# Patient Record
Sex: FEMALE | Race: WHITE | NOT HISPANIC OR LATINO | Employment: FULL TIME | URBAN - METROPOLITAN AREA
[De-identification: names, ages, dates, MRNs, and addresses within clinical notes are randomized per-mention and may not be internally consistent; named-entity substitution may affect disease eponyms.]

---

## 2017-12-05 ENCOUNTER — PRENATAL OFFICE VISIT (OUTPATIENT)
Dept: OBGYN | Facility: CLINIC | Age: 27
End: 2017-12-05

## 2017-12-05 DIAGNOSIS — Z53.9 NO SHOW: Primary | ICD-10-CM

## 2017-12-05 NOTE — MR AVS SNAPSHOT
"              After Visit Summary   2017    Harriet Temple    MRN: [id number]           Patient Information     Date Of Birth          1990        Visit Information        Provider Department      2017 9:00 AM Jodee Wynne APRN CNM [facility]        Today's Diagnoses     NO SHOW    -  1       Follow-ups after your visit        Who to contact     If you have questions or need follow up information about today's clinic visit or your schedule please contact [facility] directly at [phone].  Normal or non-critical lab and imaging results will be communicated to you by Mumarthart, letter or phone within 4 business days after the clinic has received the results. If you do not hear from us within 7 days, please contact the clinic through Mumarthart or phone. If you have a critical or abnormal lab result, we will notify you by phone as soon as possible.  Submit refill requests through ComponentLab or call your pharmacy and they will forward the refill request to us. Please allow 3 business days for your refill to be completed.          Additional Information About Your Visit        MyChart Information     ComponentLab lets you send messages to your doctor, view your test results, renew your prescriptions, schedule appointments and more. To sign up, go to [email or web address] . Click on \"Log in\" on the left side of the screen, which will take you to the Welcome page. Then click on \"Sign up Now\" on the right side of the page.     You will be asked to enter the access code listed below, as well as some personal information. Please follow the directions to create your username and password.     Your access code is: [email or web address]  Expires: 3/5/2018 11:17 AM     Your access code will  in 90 days. If you need help or a new code, please call your [facility] or [phone].        Care EveryWhere ID     This is your Care EveryWhere ID. This could be used by other organizations " to access your [facility] medical records  [id number]         Blood Pressure from Last 3 Encounters:   07/07/11 104/58    Weight from Last 3 Encounters:   07/07/11 174 lb (78.9 kg)              Today, you had the following     No orders found for display       Primary Care Provider Fax #    Physician No Ref-Primary [phone]       No address on file        Equal Access to Services     [facility]: Hadii aad ku hadasho Soomaali, waaxda luqadaha, qaybta kaalmada adeegyada, waxay idiin hayaan adeeg kharash lalibertad . So [facility] [phone].    ATENCIÓN: Si habla español, tiene a sue disposición servicios gratuitos de asistencia lingüística. Llame al [phone].    We comply with applicable federal civil rights laws and [place] laws. We do not discriminate on the basis of race, color, national origin, age, disability, sex, sexual orientation, or gender identity.            Thank you!     Thank you for choosing [facility]  for your care. Our goal is always to provide you with excellent care. Hearing back from our patients is one way we can continue to improve our services. Please take a few minutes to complete the written survey that you may receive in the mail after your visit with us. Thank you!             Your Updated Medication List - Protect others around you: Learn how to safely use, store and throw away your medicines at www.disposemymeds.org.          This list is accurate as of: 12/5/17 11:17 AM.  Always use your most recent med list.                   Brand Name Dispense Instructions for use Diagnosis    NO ACTIVE MEDICATIONS

## 2021-05-27 ENCOUNTER — RECORDS - HEALTHEAST (OUTPATIENT)
Dept: ADMINISTRATIVE | Facility: CLINIC | Age: 31
End: 2021-05-27

## 2021-05-28 ENCOUNTER — RECORDS - HEALTHEAST (OUTPATIENT)
Dept: ADMINISTRATIVE | Facility: CLINIC | Age: 31
End: 2021-05-28

## 2021-05-29 ENCOUNTER — RECORDS - HEALTHEAST (OUTPATIENT)
Dept: ADMINISTRATIVE | Facility: CLINIC | Age: 31
End: 2021-05-29

## 2021-05-30 ENCOUNTER — RECORDS - HEALTHEAST (OUTPATIENT)
Dept: ADMINISTRATIVE | Facility: CLINIC | Age: 31
End: 2021-05-30

## 2021-05-31 ENCOUNTER — RECORDS - HEALTHEAST (OUTPATIENT)
Dept: ADMINISTRATIVE | Facility: CLINIC | Age: 31
End: 2021-05-31

## 2021-06-01 ENCOUNTER — RECORDS - HEALTHEAST (OUTPATIENT)
Dept: ADMINISTRATIVE | Facility: CLINIC | Age: 31
End: 2021-06-01

## 2021-06-02 ENCOUNTER — RECORDS - HEALTHEAST (OUTPATIENT)
Dept: ADMINISTRATIVE | Facility: CLINIC | Age: 31
End: 2021-06-02

## 2022-01-13 ENCOUNTER — LAB REQUISITION (OUTPATIENT)
Dept: LAB | Facility: CLINIC | Age: 32
End: 2022-01-13

## 2022-01-13 LAB — HBV SURFACE AB SERPL IA-ACNC: 241.93 M[IU]/ML

## 2022-01-13 PROCEDURE — 86765 RUBEOLA ANTIBODY: CPT | Performed by: INTERNAL MEDICINE

## 2022-01-13 PROCEDURE — 86706 HEP B SURFACE ANTIBODY: CPT | Performed by: INTERNAL MEDICINE

## 2022-01-13 PROCEDURE — 86735 MUMPS ANTIBODY: CPT | Performed by: INTERNAL MEDICINE

## 2022-01-13 PROCEDURE — 86481 TB AG RESPONSE T-CELL SUSP: CPT | Performed by: INTERNAL MEDICINE

## 2022-01-13 PROCEDURE — 86787 VARICELLA-ZOSTER ANTIBODY: CPT | Performed by: INTERNAL MEDICINE

## 2022-01-13 PROCEDURE — 86762 RUBELLA ANTIBODY: CPT | Performed by: INTERNAL MEDICINE

## 2022-01-14 LAB
MEV IGG SER IA-ACNC: 96.5 AU/ML
MEV IGG SER IA-ACNC: POSITIVE
MUMPS ANTIBODY IGG INSTRUMENT VALUE: 86.1 AU/ML
MUV IGG SER QL IA: POSITIVE
QUANTIFERON MITOGEN: 10 IU/ML
QUANTIFERON NIL TUBE: 0.1 IU/ML
QUANTIFERON TB1 TUBE: 2.23 IU/ML
QUANTIFERON TB2 TUBE: 1.47
RUBV IGG SERPL QL IA: 2.8 INDEX
RUBV IGG SERPL QL IA: POSITIVE
VZV IGG SER QL IA: 3961 INDEX
VZV IGG SER QL IA: POSITIVE

## 2022-01-15 LAB
GAMMA INTERFERON BACKGROUND BLD IA-ACNC: 0.1 IU/ML
M TB IFN-G BLD-IMP: POSITIVE
M TB IFN-G CD4+ BCKGRND COR BLD-ACNC: 9.9 IU/ML
MITOGEN IGNF BCKGRD COR BLD-ACNC: 1.37 IU/ML
MITOGEN IGNF BCKGRD COR BLD-ACNC: 2.13 IU/ML

## 2022-01-17 ENCOUNTER — LAB REQUISITION (OUTPATIENT)
Dept: LAB | Facility: CLINIC | Age: 32
End: 2022-01-17

## 2022-01-17 PROCEDURE — 86481 TB AG RESPONSE T-CELL SUSP: CPT | Performed by: INTERNAL MEDICINE

## 2022-01-19 LAB
GAMMA INTERFERON BACKGROUND BLD IA-ACNC: 0.08 IU/ML
M TB IFN-G BLD-IMP: POSITIVE
M TB IFN-G CD4+ BCKGRND COR BLD-ACNC: 9.92 IU/ML
MITOGEN IGNF BCKGRD COR BLD-ACNC: 2.64 IU/ML
MITOGEN IGNF BCKGRD COR BLD-ACNC: 4.12 IU/ML
QUANTIFERON MITOGEN: 10 IU/ML
QUANTIFERON NIL TUBE: 0.08 IU/ML
QUANTIFERON TB1 TUBE: 4.2 IU/ML
QUANTIFERON TB2 TUBE: 2.72

## 2022-01-20 ENCOUNTER — HOSPITAL ENCOUNTER (OUTPATIENT)
Dept: GENERAL RADIOLOGY | Facility: CLINIC | Age: 32
End: 2022-01-20
Attending: INTERNAL MEDICINE

## 2022-01-20 DIAGNOSIS — R76.12 POSITIVE QUANTIFERON-TB GOLD TEST: ICD-10-CM

## 2022-01-20 PROCEDURE — 999N000028 XR CHEST 1 VIEW, EMPLOYEE HEALTH

## 2022-01-20 PROCEDURE — 99207 XR CHEST 1 VIEW, EMPLOYEE HEALTH: CPT | Mod: GC | Performed by: RADIOLOGY

## 2023-11-03 ENCOUNTER — TELEPHONE (OUTPATIENT)
Dept: TRANSPLANT | Facility: CLINIC | Age: 33
End: 2023-11-03

## 2023-11-03 NOTE — TELEPHONE ENCOUNTER
"Donor Intake Start: 23 Donor Intake Complete: 23  Gender: Female Preferred Language: English  Full Name: Harriet Temple Is  Needed: [not answered]  E-mail: [email or web address] Phone Number: [phone]  Secondary Phone: [phone]  Contact Preference: [not answered] Best Contact Time: 9am - 5pm  Emergency Contact: Clay alves Emergency Contact #: [phone]  Relationship to Contact: Contact is my spouse  : 88 Age: 33  Address: [address] [place] [address] City: [place]  State: [place] Postal Code: [id number]  Height: 5'5\" Weight: 180lbs  BMI: 30  Employment Status: Employed Has PTO for donation? Yes, using vacation  Occupation:  Requires Heavy Lifting? Yes  Education Level: GED Marital Status:   Exercise Routine: Occasional Health Insurance: Yes  Blood Type: Unknown Ethnicity/Race: White  Donor Type: Standard Voucher Donor  Prefer Remote Donation: [not answered]  Physician: Caridad dockery <br/>[place], [place]  Donating for Local Recipient  Recipient's Name: Debbie Hameed Recipient's : 89  Recipient's Status: Patient not on dialysis but  needs a transplant soon.  How Candidate Knows Recip: Extended Family  Candidate communicates w/  Recip: Several Times Per Month  Possible Interest In:  Motivation to donate: To help my cousin out and try to save her  Living Donor Pre-Screening  Is In [place]  Will accept blood transfusions? Yes  Has been diagnosed with kidney disease? No  Has had a heart attack? No  Has Diabetes (High BGs)? No  Has had cancer? No  Has had kidney stones? Yes   - number of episodes? 1   - last stones passed? 15+ years   - last stones treated? With surgical intervention  Has ever been pregnant? Yes   - Is Currently Pregnant? No   - Months Since Pregnant? 24+   - Is Currently Nursing? No   - Had gestational diabetes? No   - Hypertension during pregnancy? Never  Is Planning on Pregnancy? No  Is Taking Birth Control? Yes   - Birth Control Months? 9   - " BirthControlForm? IUD   - Birth Control Complications? No   - Is Able To Stop Birth Control? Yes  Has Used Tobacco? No  Has HIV? No  Is Currently Incarcerated? No  Is Currently Residing in [place]  History Misc  Has Allergies? No  Has had Surgeries? Yes  Surgery When  Kidney stones 15 years ago  Takes Medication? No  Medical History  History of High BP? Never  History of CABG (bypass surgery)? No  History of blood clots? Never  History of coronary disease? Never  History of high cholesterol or triglycerides? Never  Has stents implanted? No  History of chest pain during exercise? No  History of chest pain at other times? No  Results of climbing 2 flights of stairs? No Problem  Had stress test in last year? No  Has had stroke? No  Has had leg bypass? No  History of lung disease? Never  History of COPD? Never  History of TB? Never  History of Pneumonia? Never  Has respiratory issues? No  Has HIV? No  Has Gastro Issues? No  History of Gallstones? Never  History of Pancreatitis? Never  History of Liver Disease? Never  History of Hepatitis B? Never  History of Hepatitis C? Never  History of bleeding problems? Never  History of UTIs? Yes   - UTI episodes: 2   - years since last UTI: 1 years  History of kidney damage? Never  History of Proteinuria? Never  History of Hematuria? Never  History of neuro disease? Never  History of seizure? Never  History of lupus? Never  History of paralysis? Never  History of arthritis? Never  History of neuropathy? Never  History of depression? Never  History of anxiety? Never  History of documented psychiatric illness? Never  History of Fibroid Uterus? Never  History of Endometriosis? Never  History of Polycystic Ovaries? Never  Has had Miscarriages? Yes   - how many: 1   - had late term miscarriage? No  Has had abortions? No  Has had transfusions? No  History of Obesity? No  History of Fabry's Disease? No  History of Sickle Cell Disease? No  History of Sickle Cell Trait? No  History of  Sarcoidosis? No  Has auto-immune disease? Yes   - Auto-Immune Description: Hashimotos  Has had Physical Exam? Yes   - how many years ago: 1  Has had Mammogram? No  Has had Pap Smear? Yes   - how many years ago: 1  Colonoscopy? No  Medical history comments? [no comments]  Living Donor Family Medical History  Anyone with kidney disease? Yes   - which family members: Cousin  Anyone with liver disease? No  Anyone with heart disease? No  Anyone with coronary artery disease? No  Anyone with high blood pressure? No  Anyone with blood disorder? No  Anyone with cancer? No  Anyone with kidney cancer? No  Anyone with diabetes? No  Is mother alive? Yes  Mother's age? 55  Is father alive? Yes  Father's age? 65  How many siblings? 4  How many adult children? 2  How many children under 18? 4  Social History  Has Used Alcohol? No  Has Used Drugs? No  Has had legal issues w/ law enforcement? No  Traveled over 100 miles from home in last year? No  Has had suicidal thoughts or attempts in the last five years? No

## 2023-11-06 ENCOUNTER — DOCUMENTATION ONLY (OUTPATIENT)
Dept: TRANSPLANT | Facility: CLINIC | Age: 33
End: 2023-11-06

## 2023-11-07 ENCOUNTER — TELEPHONE (OUTPATIENT)
Dept: TRANSPLANT | Facility: CLINIC | Age: 33
End: 2023-11-07

## 2023-11-07 NOTE — TELEPHONE ENCOUNTER
Initial Independent Living Donor Advocate contact made with potential donor today.  I introduced myself and my role during the donation process, including:   JAMES ROLE   The federal government requires that all licensed transplant centers provide the living donor with an Independent Living Donor Advocate (JAMES).  I do not meet recipients or attend meetings that discuss their care or decision to transplant them. My role is separate to avoid any conflict of interest.  My role is to ensure:  1) your rights are protected;  2) you get all the information you need from the transplant team to make a fully informed decision whether to donate;   3) that living donation is in your best interest.   4) that you have the right to decide NOT to go forward with living donation at any time during this process.  I am available to you throughout the workup, during surgery phase and follow-up at home.     WORKUP & PRIVACY   Your identity and workup are not shared with the recipient at any time.   The recipient's insurance covers the medical expenses related to the donor evaluation and surgery.  However, it is important that you carry your own health insurance to address any medical issues that are found and are NOT related to living donation.  Additionally, age appropriate cancer screening (I.e. mammograms,  colonoscopies, etc) is required and would be through your insurance.  There is a psychosocial and medical donor workup that consists of testing to determine if you are healthy enough to donate. Workup tests include tissue typing/genetics, many blood draws, urine collection/ (kidney function testing), chest x-ray, EKG/other heart testing, CT scan. Age appropriate cancer screening is required and would be through your insurance. As you complete each step then you may move on to the next.  Workup can take as little or as long as you need and you can stop the process at any time. Transplant is a treatment option, not a cure. A kidney  from a living kidney donor can last 12-14 years.  Other treatment options are  donation and two types of dialysis.   This is major surgery and your estimated hospital stay is approximately 1-2 nights.  After surgery, there are driving and lifting restrictions - no driving for two weeks and no lifting over ten pounds for 8 weeks.  Donors are routinely off from work for 4 - 6 weeks after surgery, and potentially longer if they have a physical job.     If you anticipate lost wages due to donation, donor wage reimbursement options may be available to you and will be reviewed with you during the evaluation process. Donor Shield and NLDAC explained.  We reviewed the importance of completing follow-up labs and surveys at six months, 1 year and 2 years after donation to monitor kidney health and the impact donation has had on their life post donation.        QUESTIONS    Have you received the Welcome e-mail that includes copies of the informed consent, financial letter, information on donor shield and NLDAC from the transplant department? Yes.    Have you discussed with anyone your potential decision to donate?   Yes.    Is anyone pressuring or coercing you to donate? No.    Have you discussed any financial arrangements with recipient around donating a kidney? No.    Are you aware that you can confidentially opt out at any time, up to and including day of donation? Yes.    At this time, would you like to proceed with the medical evaluation to see if you can be a kidney donor?  Yes.    If yes, I will make an appointment for your donor coordinator to reach out to you with next steps.     Contact information for JAMES's was provided Yes.    Vee Savage- [phone]  Argentina Clarke-  [phone]    Confirmed that she reviewed Informed consent document and all questions answered.  Reviewed that they will receive Docusign to obtain electronic signature for the following: Informed consent, SRTR data, NATHANAEL for medical  information, Auth for Electronic communication, Kidney for Life and will need their signed consent back before proceeding with evaluation.     Time frame for donation: open  Paired exchange was introduced  Yes.      MyChart was initiated  Yes.  CareEverywhere was initiated Yes.    JAMES NOTES: Harriet wants to donate a kidney to her cousin. She lives with her SO and 6 children, ages spanning from 2-19yo. She states that she has a lot of support. 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 She is scheduled to talk to Adore Andrade on 11/13 at 4pm.       Duration of call 35 minutes    LOREN Nance, CCTSW   Independent Living Donor Advocate  [facility], [facility], [facility]  Direct: [phone]  E-Mail: [email or web address]

## 2023-11-13 ENCOUNTER — TELEPHONE (OUTPATIENT)
Dept: TRANSPLANT | Facility: CLINIC | Age: 33
End: 2023-11-13

## 2023-11-13 PROBLEM — N20.0 KIDNEY STONE: Status: ACTIVE | Noted: 2023-11-13

## 2023-12-03 ENCOUNTER — HEALTH MAINTENANCE LETTER (OUTPATIENT)
Age: 33
End: 2023-12-03